# Patient Record
Sex: FEMALE | Race: WHITE | URBAN - METROPOLITAN AREA
[De-identification: names, ages, dates, MRNs, and addresses within clinical notes are randomized per-mention and may not be internally consistent; named-entity substitution may affect disease eponyms.]

---

## 2022-03-11 ENCOUNTER — HOSPITAL ENCOUNTER (OUTPATIENT)
Age: 52
Discharge: HOME | End: 2022-03-13

## 2022-03-11 PROCEDURE — 88302 TISSUE EXAM BY PATHOLOGIST: CPT

## 2025-04-22 ENCOUNTER — HOSPITAL ENCOUNTER (OUTPATIENT)
Dept: RADIOLOGY | Facility: HOSPITAL | Age: 55
Discharge: HOME | End: 2025-04-22
Payer: COMMERCIAL

## 2025-04-22 ENCOUNTER — APPOINTMENT (OUTPATIENT)
Dept: ORTHOPEDIC SURGERY | Facility: CLINIC | Age: 55
End: 2025-04-22
Payer: COMMERCIAL

## 2025-04-22 DIAGNOSIS — M41.9 SCOLIOSIS OF THORACOLUMBAR SPINE, UNSPECIFIED SCOLIOSIS TYPE: ICD-10-CM

## 2025-04-22 DIAGNOSIS — G89.29 CHRONIC MIDLINE LOW BACK PAIN WITHOUT SCIATICA: ICD-10-CM

## 2025-04-22 DIAGNOSIS — M54.50 CHRONIC MIDLINE LOW BACK PAIN WITHOUT SCIATICA: ICD-10-CM

## 2025-04-22 DIAGNOSIS — M41.9 SCOLIOSIS OF THORACOLUMBAR SPINE, UNSPECIFIED SCOLIOSIS TYPE: Primary | ICD-10-CM

## 2025-04-22 PROCEDURE — 72082 X-RAY EXAM ENTIRE SPI 2/3 VW: CPT | Performed by: STUDENT IN AN ORGANIZED HEALTH CARE EDUCATION/TRAINING PROGRAM

## 2025-04-22 PROCEDURE — 1036F TOBACCO NON-USER: CPT | Performed by: PHYSICIAN ASSISTANT

## 2025-04-22 PROCEDURE — 99203 OFFICE O/P NEW LOW 30 MIN: CPT | Performed by: PHYSICIAN ASSISTANT

## 2025-04-22 PROCEDURE — 72082 X-RAY EXAM ENTIRE SPI 2/3 VW: CPT

## 2025-04-22 NOTE — PROGRESS NOTES
Chief Complaint: Scoliosis    HPI: Arpita Sofia is a 55 y.o. female patient with a history of scoliosis presenting with worsening posture and low back pain.  She had a surgery in the early 90s which included the placement of Doty rods.  In 2017 she underwent an L1-S1 fusion with L4-5 and L5-S1 interbody fusion and removal of previous hardware with Dr. Paz.  The 2017 surgery did significantly improve her pain, specifically right leg radiculopathy.  Over the last year she has noticed worsening of her daily symptoms.  She feels her posture is worsening she feels more crooked and she is having increased discomfort with standing and walking.  She does also have chronic low back pain, but she mentions she can live with this pain.  No pain radiating down her legs.  She does have intermittent right ankle pain.  She denies weakness, dragging or tripping over her feet.  She has full control of her bowel and bladder.    No anticoagulations  Negative tobacco use    Review of Systems    All other systems have been reviewed and are negative for complaint. All pertinent positive and negative as listed in history of present illness.    Medical History[1]     Surgical History[2]     RX Allergies[3]     Medications Ordered Prior to Encounter[4]       PE:   Const: Well-appearing, well-nourished female in no distress.  Eyes: Normal appearing sclera and conjunctiva, no jaundice, pupils normal in appearance.  Resp: breathing comfortably, normal respiratory rate.  CV: No upper or lower extremity edema.  Musculoskeletal: Normal gait.  Able to heel/toe walk without difficulty.  Lumbar ROM is supple.  Strength exam of the lower extremities reveals 5/5 strength in all major muscle groups.  Negative straight leg raise bilaterally.  Neuro: Sensation is intact and equal bilaterally. Deep tendon reflexes are normal and symmetric.  No clonus.  Skin: Intact without any lesions, normal turgor.  Psych: Alert and oriented x3, normal mood  and affect.        A/P:    The plan is to start some conservative treatment for her low back pain and postural changes, a referral for physical therapy was provided today.  I would like to get updated images of her entire spine to assess her curvature, EOS films were ordered today.  We will touch base once her images are complete.    **This note was dictated using speech recognition software and was not corrected for spelling or grammatical errors**             [1]   Past Medical History:  Diagnosis Date    Adolescent idiopathic scoliosis, lumbosacral region     Adolescent idiopathic scoliosis of lumbosacral spine    Personal history of other diseases of the musculoskeletal system and connective tissue     History of scoliosis    Personal history of other endocrine, nutritional and metabolic disease     History of hypothyroidism   [2]   Past Surgical History:  Procedure Laterality Date    OTHER SURGICAL HISTORY  11/07/2017    Spinal Arthrodesis For Deformity   [3] Not on File  [4]   No current outpatient medications on file prior to visit.     No current facility-administered medications on file prior to visit.

## 2025-04-30 DIAGNOSIS — M96.0 PSEUDARTHROSIS AFTER FUSION OR ARTHRODESIS: Primary | ICD-10-CM

## 2025-05-18 ENCOUNTER — HOSPITAL ENCOUNTER (OUTPATIENT)
Dept: RADIOLOGY | Facility: HOSPITAL | Age: 55
Discharge: HOME | End: 2025-05-18
Payer: COMMERCIAL

## 2025-05-18 DIAGNOSIS — M96.0 PSEUDARTHROSIS AFTER FUSION OR ARTHRODESIS: ICD-10-CM

## 2025-05-18 PROCEDURE — 72131 CT LUMBAR SPINE W/O DYE: CPT

## 2025-05-18 PROCEDURE — 72131 CT LUMBAR SPINE W/O DYE: CPT | Performed by: STUDENT IN AN ORGANIZED HEALTH CARE EDUCATION/TRAINING PROGRAM

## 2025-05-28 ENCOUNTER — APPOINTMENT (OUTPATIENT)
Dept: ORTHOPEDIC SURGERY | Facility: CLINIC | Age: 55
End: 2025-05-28
Payer: COMMERCIAL

## 2025-05-28 DIAGNOSIS — M41.9 SCOLIOSIS OF THORACOLUMBAR SPINE, UNSPECIFIED SCOLIOSIS TYPE: ICD-10-CM

## 2025-05-28 PROCEDURE — 99214 OFFICE O/P EST MOD 30 MIN: CPT | Performed by: ORTHOPAEDIC SURGERY

## 2025-05-28 ASSESSMENT — PAIN - FUNCTIONAL ASSESSMENT: PAIN_FUNCTIONAL_ASSESSMENT: NO/DENIES PAIN

## 2025-05-28 NOTE — LETTER
May 28, 2025     Matthew Benitez MD  [address]  [address]  [facility] [address]    Patient: Arpita Sofia   YOB: 1970   Date of Visit: 5/28/2025       Dear Dr. Matthew Benitez MD:    Thank you for referring Arpita Sofia to me for evaluation. Below are my notes for this consultation.  If you have questions, please do not hesitate to call me. I look forward to following your patient along with you.       Sincerely,     Garcia Paz MD      CC: No Recipients  ______________________________________________________________________________________    Arpita returns for long-term follow-up.  She is 55 now.  In 2017 she underwent a lateral and posterior decompression and fusion for severe spinal stenosis below a long Doty erin fusion.  She did quite well with this.  She had complete relief of her disabling preoperative back and radicular pain.  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  She is functional.  She is really not having significant pain.  She is a bit concerned about her overall frontal plane posture.    We had suggested the possibility of a leg length inequality assessment and perhaps a shoe lift to build up.    Family, social, and medical histories are obtained and reviewed.    30-point, patient-recorded Review of Systems is personally obtained and reviewed. Inclusive is no history of weight loss, change in appetite, recent change in activity level, change in bowel or bladder habits, fevers, chills, malaise, or night pain.    On exam very healthy-appearing woman no acute distress.  She has perfect sagittal alignment.  She does slightly list towards the left.  It is correctable.    Her strength is intact in both lower extremities without pathologic reflexes.    She had a recent CT scan that shows her fusion is solidly incorporated without any instrumentation failure.    Overall she is doing quite well.  I have reassured her about her  overall balance.  Her spine is clearly stable.  I think it is very unlikely there will be any type of progression in the future.    I think a good exercise program to work on postural strengthening certainly is indicated.  We can also arrange for her to have a true leg length inequality assessment.    ** Dictated with voice recognition software and not immediately reviewed for errors in grammar and/or spelling **

## 2025-05-28 NOTE — PROGRESS NOTES
Arpita returns for long-term follow-up.  She is 55 now.  In 2017 she underwent a lateral and posterior decompression and fusion for severe spinal stenosis below a long Doty erin fusion.  She did quite well with this.  She had complete relief of her disabling preoperative back and radicular pain.  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  She is functional.  She is really not having significant pain.  She is a bit concerned about her overall frontal plane posture.    We had suggested the possibility of a leg length inequality assessment and perhaps a shoe lift to build up.    Family, social, and medical histories are obtained and reviewed.    30-point, patient-recorded Review of Systems is personally obtained and reviewed. Inclusive is no history of weight loss, change in appetite, recent change in activity level, change in bowel or bladder habits, fevers, chills, malaise, or night pain.    On exam very healthy-appearing woman no acute distress.  She has perfect sagittal alignment.  She does slightly list towards the left.  It is correctable.    Her strength is intact in both lower extremities without pathologic reflexes.    She had a recent CT scan that shows her fusion is solidly incorporated without any instrumentation failure.    Overall she is doing quite well.  I have reassured her about her overall balance.  Her spine is clearly stable.  I think it is very unlikely there will be any type of progression in the future.    I think a good exercise program to work on postural strengthening certainly is indicated.  We can also arrange for her to have a true leg length inequality assessment.    ** Dictated with voice recognition software and not immediately reviewed for errors in grammar and/or spelling **